# Patient Record
Sex: FEMALE | Race: WHITE | URBAN - METROPOLITAN AREA
[De-identification: names, ages, dates, MRNs, and addresses within clinical notes are randomized per-mention and may not be internally consistent; named-entity substitution may affect disease eponyms.]

---

## 2021-01-26 ENCOUNTER — VIRTUAL VISIT (OUTPATIENT)
Dept: INTERNAL MEDICINE CLINIC | Age: 26
End: 2021-01-26
Payer: COMMERCIAL

## 2021-01-26 DIAGNOSIS — L70.0 ACNE VULGARIS: ICD-10-CM

## 2021-01-26 DIAGNOSIS — Z00.00 ROUTINE GENERAL MEDICAL EXAMINATION AT A HEALTH CARE FACILITY: ICD-10-CM

## 2021-01-26 DIAGNOSIS — R09.89 GLOBUS PHARYNGEUS: ICD-10-CM

## 2021-01-26 DIAGNOSIS — Z97.5 IUD (INTRAUTERINE DEVICE) IN PLACE: Primary | ICD-10-CM

## 2021-01-26 DIAGNOSIS — G44.029 CHRONIC CLUSTER HEADACHE, NOT INTRACTABLE: ICD-10-CM

## 2021-01-26 PROBLEM — R09.A2 GLOBUS PHARYNGEUS: Status: ACTIVE | Noted: 2021-01-26

## 2021-01-26 PROBLEM — K80.20 ASYMPTOMATIC CHOLELITHIASIS: Status: ACTIVE | Noted: 2020-05-07

## 2021-01-26 PROCEDURE — 99204 OFFICE O/P NEW MOD 45 MIN: CPT | Performed by: NURSE PRACTITIONER

## 2021-01-26 RX ORDER — GALCANEZUMAB 120 MG/ML
120 INJECTION, SOLUTION SUBCUTANEOUS
Qty: 1 PEN | Refills: 3 | Status: SHIPPED | OUTPATIENT
Start: 2021-01-26 | End: 2021-02-10 | Stop reason: SDUPTHER

## 2021-01-26 RX ORDER — ZOLMITRIPTAN 5 MG/1
TABLET, FILM COATED ORAL
COMMUNITY
Start: 2020-07-15

## 2021-01-26 RX ORDER — SPIRONOLACTONE 100 MG/1
100 TABLET, FILM COATED ORAL DAILY
COMMUNITY

## 2021-01-26 RX ORDER — GALCANEZUMAB 120 MG/ML
INJECTION, SOLUTION SUBCUTANEOUS
COMMUNITY
End: 2021-01-26 | Stop reason: SDUPTHER

## 2021-01-26 RX ORDER — SUMATRIPTAN 100 MG/1
100 TABLET, FILM COATED ORAL
COMMUNITY
End: 2021-01-26

## 2021-01-26 ASSESSMENT — ENCOUNTER SYMPTOMS
COLOR CHANGE: 0
COUGH: 0
CONSTIPATION: 0
SINUS PAIN: 0
ABDOMINAL PAIN: 0
DIARRHEA: 0
WHEEZING: 0
SINUS PRESSURE: 0
BACK PAIN: 0
SHORTNESS OF BREATH: 0

## 2021-01-26 ASSESSMENT — PATIENT HEALTH QUESTIONNAIRE - PHQ9
SUM OF ALL RESPONSES TO PHQ QUESTIONS 1-9: 0
2. FEELING DOWN, DEPRESSED OR HOPELESS: 0
1. LITTLE INTEREST OR PLEASURE IN DOING THINGS: 0
SUM OF ALL RESPONSES TO PHQ QUESTIONS 1-9: 0
SUM OF ALL RESPONSES TO PHQ9 QUESTIONS 1 & 2: 0

## 2021-01-26 NOTE — ASSESSMENT & PLAN NOTE
? sinus drainage vs GERD symptoms  Trial Flonase daily  If no improvement trial Nexium  Call for worsening symptoms

## 2021-01-26 NOTE — PROGRESS NOTES
Laura Bravo (:  1995) is a 32 y.o. female,New patient, here for evaluation of the following chief complaint(s): Establish Care      ASSESSMENT/PLAN:  1. IUD (intrauterine device) in place  -     [address], [place], Gynecology, Markos Prado  2. Routine general medical examination at a health care facility  Assessment & Plan:  Discussed vaccines   Encouraged healthy diet and exercise   We will schedule full physical  3. Acne vulgaris  Assessment & Plan:  Stable, controlled  Continue Aldactone  4. Chronic cluster headache, not intractable  Assessment & Plan:  Stable, controlled  Continue Emgality  Zomig for abortive medication  5. Globus pharyngeus  Assessment & Plan:  ? sinus drainage vs GERD symptoms  Trial Flonase daily  If no improvement trial Nexium  Call for worsening symptoms       No follow-ups on file. SUBJECTIVE/OBJECTI  HPI   And is here to establish care. Recently moved to [place] from [facility]. She is generally healthy. PMH includes migraines for which she is taking Emgality and Zomig. Reports migraine every other month or so. Prior to starting Emgality she was getting frequent migraines. Has tried and failed amitriptyline,  and Topiramate as well as NSAIDs, Tylenol, Excedrin for abortive medications. She has been on Emgality for about a year and is doing very well. She recently started taking Aldactone 100 mg for acne. Tolerating well. Has noticed improvement in her symptoms. She does have an IUD in place that will need to be removed and replaced soon. She will need to establish with GYN. Today she complains of chronic phlegm in her throat and constantly having to clear her throat. This has been an ongoing issue for some time. Denies throat pain or dyspepsia. No difficulty swallowing. She does have some sinus congestion and postnasal drip. She takes antihistamines in the spring and fall. Not taking them currently.     Past Medical History: Diagnosis Date    Gastritis      Past Surgical History:   Procedure Laterality Date    SPINAL FUSION  2005     Family History   Problem Relation Age of Onset    Diabetes Maternal Aunt     Cancer Maternal Grandfather         leukemia     Current Outpatient Medications   Medication Sig Dispense Refill    spironolactone (ALDACTONE) 100 MG tablet Take 100 mg by mouth daily      levonorgestrel (MIRENA, 52 MG,) IUD 52 mg 1 each by Intrauterine route once      ZOLMitriptan (ZOMIG) 5 MG tablet Take 1 tab at onset of HA then may repeat 1 time 1 tab in 2 hours if HA persists MAX 2 tabs a day      Galcanezumab-gnlm (EMGALITY) 120 MG/ML SOAJ Inject 120 mg into the skin every 30 days Inject into the skin 1 pen 3     No current facility-administered medications for this visit. Review of Systems   Constitutional: Negative for chills, fatigue and fever. HENT: Negative for congestion, sinus pressure and sinus pain. Respiratory: Negative for cough, shortness of breath and wheezing. Cardiovascular: Negative for chest pain and palpitations. Gastrointestinal: Negative for abdominal pain, constipation and diarrhea. Musculoskeletal: Negative for arthralgias, back pain and myalgias. Skin: Negative for color change, pallor and rash. Neurological: Negative for dizziness, syncope, weakness, light-headedness and headaches. Psychiatric/Behavioral: Negative for behavioral problems, confusion and sleep disturbance. The patient is not nervous/anxious. No flowsheet data found. Physical Exam  Constitutional:       Appearance: Normal appearance. HENT:      Head: Normocephalic and atraumatic. Mouth/Throat:      Mouth: Mucous membranes are moist.   Eyes:      Extraocular Movements: Extraocular movements intact. Conjunctiva/sclera: Conjunctivae normal.   Neck:      Musculoskeletal: Normal range of motion.    Pulmonary:      Effort: Pulmonary effort is normal.   Skin: Coloration: Skin is not jaundiced or pale. Neurological:      General: No focal deficit present. Mental Status: She is alert and oriented to person, place, and time. Psychiatric:         Mood and Affect: Mood normal.         Behavior: Behavior normal.         Thought Content: Thought content normal.            On this date 01/26/21 I have spent 45 minutes reviewing previous notes, test results and face to face (virtual) with the patient discussing the diagnosis and importance of compliance with the treatment plan as well as documenting on the day of the visit. Kelly Urias is a 32 y.o. female being evaluated by a Virtual Visit (video visit) encounter to address concerns as mentioned above. A caregiver was present when appropriate. Due to this being a TeleHealth encounter (During [address] emergency), evaluation of the following organ systems was limited: Vitals/Constitutional/EENT/Resp/CV/GI//MS/Neuro/Skin/Heme-Lymph-Imm. Pursuant to the emergency declaration under the [address] [place] [address] authority and the [employer] Act, this Virtual Visit was conducted with patient's (and/or legal guardian's) consent, to reduce the patient's risk of exposure to COVID-19 and provide necessary medical care. The patient (and/or legal guardian) has also been advised to contact this office for worsening conditions or problems, and seek emergency medical treatment and/or call 911 if deemed necessary. Patient identification was verified at the start of the visit: Yes    Services were provided through a video synchronous discussion virtually to substitute for in-person clinic visit. Patient was located at home and provider was located in office or at home. An electronic signature was used to authenticate this note.     --Donnamaria Memos, APRN - CNP

## 2021-02-01 ENCOUNTER — TELEPHONE (OUTPATIENT)
Dept: INTERNAL MEDICINE CLINIC | Age: 26
End: 2021-02-01

## 2021-02-01 RX ORDER — FLUTICASONE PROPIONATE 50 MCG
1 SPRAY, SUSPENSION (ML) NASAL DAILY
Qty: 2 BOTTLE | Refills: 1 | Status: SHIPPED | OUTPATIENT
Start: 2021-02-01

## 2021-02-01 NOTE — TELEPHONE ENCOUNTER
----- Message from Jose Walters sent at 2/1/2021 12:36 PM EST -----  Subject: Message to Provider    QUESTIONS  Information for Provider? Pt called in due to a medication that was   supposed to be called in for her. It was nasal inhalant   pt is not sure on the name. She would like a call back to confirm. ---------------------------------------------------------------------------  --------------  Laure ELIAS  What is the best way for the office to contact you? OK to leave message on   voicemail  Preferred Call Back Phone Number? [phone]  ---------------------------------------------------------------------------  --------------  SCRIPT ANSWERS  Relationship to Patient?  Self

## 2021-02-10 ENCOUNTER — TELEPHONE (OUTPATIENT)
Dept: INTERNAL MEDICINE CLINIC | Age: 26
End: 2021-02-10

## 2021-02-10 RX ORDER — GALCANEZUMAB 120 MG/ML
120 INJECTION, SOLUTION SUBCUTANEOUS
Qty: 1 PEN | Refills: 3 | Status: SHIPPED | OUTPATIENT
Start: 2021-02-10 | End: 2022-02-15

## 2021-02-10 NOTE — TELEPHONE ENCOUNTER
Patient requesting a medication refill.   Medication Galcanezumab-gnlm (EMGALITY)  Dosage 120 MG/ML SOAJ   Frequency Inject 120 mg into the skin every 30 days Inject into the skin  Last filled on 1/26/21  [id number] [address] [phone] - F [phone]

## 2021-02-24 ENCOUNTER — TELEPHONE (OUTPATIENT)
Dept: INTERNAL MEDICINE CLINIC | Age: 26
End: 2021-02-24

## 2021-02-25 PROBLEM — Z00.00 ROUTINE GENERAL MEDICAL EXAMINATION AT A HEALTH CARE FACILITY: Status: RESOLVED | Noted: 2021-01-26 | Resolved: 2021-02-25

## 2021-04-06 ENCOUNTER — IMMUNIZATION (OUTPATIENT)
Dept: FAMILY MEDICINE CLINIC | Age: 26
End: 2021-04-06
Payer: COMMERCIAL

## 2021-04-06 PROCEDURE — 91300 COVID-19, PFIZER VACCINE 30MCG/0.3ML DOSE: CPT | Performed by: FAMILY MEDICINE

## 2021-04-06 PROCEDURE — 0001A COVID-19, PFIZER VACCINE 30MCG/0.3ML DOSE: CPT | Performed by: FAMILY MEDICINE

## 2021-04-27 ENCOUNTER — IMMUNIZATION (OUTPATIENT)
Dept: FAMILY MEDICINE CLINIC | Age: 26
End: 2021-04-27
Payer: COMMERCIAL

## 2021-04-27 PROCEDURE — 0002A COVID-19, PFIZER VACCINE 30MCG/0.3ML DOSE: CPT | Performed by: FAMILY MEDICINE

## 2021-04-27 PROCEDURE — 91300 COVID-19, PFIZER VACCINE 30MCG/0.3ML DOSE: CPT | Performed by: FAMILY MEDICINE

## 2021-09-29 ENCOUNTER — OFFICE VISIT (OUTPATIENT)
Dept: INTERNAL MEDICINE CLINIC | Age: 26
End: 2021-09-29
Payer: COMMERCIAL

## 2021-09-29 VITALS
OXYGEN SATURATION: 98 % | BODY MASS INDEX: 21.5 KG/M2 | HEART RATE: 88 BPM | SYSTOLIC BLOOD PRESSURE: 114 MMHG | WEIGHT: 137 LBS | DIASTOLIC BLOOD PRESSURE: 58 MMHG | HEIGHT: 67 IN

## 2021-09-29 DIAGNOSIS — M25.551 BILATERAL HIP PAIN: ICD-10-CM

## 2021-09-29 DIAGNOSIS — G43.109 MIGRAINE WITH AURA AND WITHOUT STATUS MIGRAINOSUS, NOT INTRACTABLE: ICD-10-CM

## 2021-09-29 DIAGNOSIS — Z00.00 ROUTINE GENERAL MEDICAL EXAMINATION AT A HEALTH CARE FACILITY: Primary | ICD-10-CM

## 2021-09-29 DIAGNOSIS — M25.552 BILATERAL HIP PAIN: ICD-10-CM

## 2021-09-29 DIAGNOSIS — E55.9 VITAMIN D DEFICIENCY: ICD-10-CM

## 2021-09-29 DIAGNOSIS — R53.82 CHRONIC FATIGUE: ICD-10-CM

## 2021-09-29 PROCEDURE — 90674 CCIIV4 VAC NO PRSV 0.5 ML IM: CPT | Performed by: NURSE PRACTITIONER

## 2021-09-29 PROCEDURE — 90471 IMMUNIZATION ADMIN: CPT | Performed by: NURSE PRACTITIONER

## 2021-09-29 PROCEDURE — 99395 PREV VISIT EST AGE 18-39: CPT | Performed by: NURSE PRACTITIONER

## 2021-09-29 RX ORDER — ZOLMITRIPTAN 5 MG/1
5 TABLET, FILM COATED ORAL PRN
COMMUNITY

## 2021-09-29 RX ORDER — GALCANEZUMAB 120 MG/ML
INJECTION, SOLUTION SUBCUTANEOUS
COMMUNITY
End: 2022-10-11 | Stop reason: ALTCHOICE

## 2021-09-29 ASSESSMENT — ENCOUNTER SYMPTOMS
DIARRHEA: 0
ABDOMINAL PAIN: 0
SINUS PRESSURE: 0
COUGH: 0
SHORTNESS OF BREATH: 0
SINUS PAIN: 0
WHEEZING: 0
COLOR CHANGE: 0
CONSTIPATION: 0
BACK PAIN: 1

## 2021-09-29 ASSESSMENT — PATIENT HEALTH QUESTIONNAIRE - PHQ9
DEPRESSION UNABLE TO ASSESS: FUNCTIONAL CAPACITY MOTIVATION LIMITS ACCURACY
2. FEELING DOWN, DEPRESSED OR HOPELESS: 0
SUM OF ALL RESPONSES TO PHQ9 QUESTIONS 1 & 2: 0
SUM OF ALL RESPONSES TO PHQ QUESTIONS 1-9: 0
1. LITTLE INTEREST OR PLEASURE IN DOING THINGS: 0
SUM OF ALL RESPONSES TO PHQ QUESTIONS 1-9: 0
SUM OF ALL RESPONSES TO PHQ QUESTIONS 1-9: 0

## 2021-09-29 NOTE — ASSESSMENT & PLAN NOTE
?  Low back etiology  Full range of motion  Provided with home stretches and exercises  Encouraged frequent movement  Encouraged good posture while working  Advised sit to stand desk or exercise ball chair

## 2021-09-29 NOTE — ASSESSMENT & PLAN NOTE
Stable, not controlled   Not tolerating zolmitriptan  We will start Nurtec as needed  Continue Emgality for prophylaxis

## 2021-09-29 NOTE — PROGRESS NOTES
Subjective:      Patient ID: Cruzito Harp is a 32 y.o. y.o. female. HPI    Cruzito Harp is here for a physical.    She is doing well overall. Following with GYN for women's health. She is taking her Emgality for migraine prophylaxis. This is working well overall. She continues to get 3 or 4 migraines per month. She takes zolmitriptan for this but she does experience significant side effects including fatigue and nausea. She does not like to take this unless her migraine is severe. Most the time she just suffers through it. Was previously taking sumatriptan but did not tolerate this. She also reports bilateral hip pain over the last 3 months. She does report a more sedentary lifestyle while working from home. Tries to avoid sitting in 1 position for a long time. Pain is described as dull and achy and on bilateral lateral hip. Does occasionally have low back pain as well. Pain does not radiate. No weakness or bowel or bladder incontinence. Chief Complaint   Patient presents with    Annual Exam       Vitals:    09/29/21 1142   BP: (!) 114/58   Pulse: 88   SpO2: 98%       Wt Readings from Last 3 Encounters:   09/29/21 137 lb (62.1 kg)       No past medical history on file. No past surgical history on file. No family history on file.     Social History     Tobacco Use    Smoking status: Never Smoker    Smokeless tobacco: Never Used   Substance Use Topics    Alcohol use: Not on file    Drug use: Not on file       Immunization History   Administered Date(s) Administered    Influenza, MDCK Quadv, IM, PF (Flucelvax 2 yrs and older) 09/29/2021       Health Maintenance   Topic Date Due    Hepatitis C screen  Never done    Varicella vaccine (1 of 2 - 2-dose childhood series) Never done    HPV vaccine (1 - 2-dose series) Never done    COVID-19 Vaccine (1) Never done    HIV screen  Never done    DTaP/Tdap/Td vaccine (1 - Tdap) Never done    Pap smear  Never done    Flu vaccine Completed    Hepatitis A vaccine  Aged Out    Hepatitis B vaccine  Aged Out    Hib vaccine  Aged Out    Meningococcal (ACWY) vaccine  Aged Out    Pneumococcal 0-64 years Vaccine  Aged Out       Discussed over the counter medication with patient. Israel Ruff received counseling on the following healthy behaviors: nutrition and exercise    Patient given educational materials on Nutrition and Exercise    Discussed use, benefit, and side effects of prescribed medications. Barriers to medication compliance addressed. Allpatient questions answered. Pt voiced understanding. Medications reviewed and patient understands. Questions answered    Review of Systems   Constitutional: Negative for chills, fatigue and fever. HENT: Negative for congestion, sinus pressure and sinus pain. Respiratory: Negative for cough, shortness of breath and wheezing. Cardiovascular: Negative for chest pain and palpitations. Gastrointestinal: Negative for abdominal pain, constipation and diarrhea. Musculoskeletal: Positive for arthralgias (bilateral hip pain) and back pain. Negative for myalgias. Skin: Negative for color change, pallor and rash. Neurological: Negative for dizziness, syncope, weakness, light-headedness and headaches. Psychiatric/Behavioral: Negative for behavioral problems, confusion and sleep disturbance. The patient is not nervous/anxious. Objective:   Physical Exam  Constitutional:       Appearance: She is well-developed. HENT:      Head: Normocephalic and atraumatic. Right Ear: Tympanic membrane normal.      Left Ear: Tympanic membrane normal.      Mouth/Throat:      Mouth: Mucous membranes are moist.      Pharynx: Oropharynx is clear. Eyes:      Conjunctiva/sclera: Conjunctivae normal.      Pupils: Pupils are equal, round, and reactive to light. Neck:      Thyroid: No thyromegaly. Vascular: No JVD. Cardiovascular:      Rate and Rhythm: Normal rate and regular rhythm. Heart sounds: Normal heart sounds. Pulmonary:      Effort: Pulmonary effort is normal. No respiratory distress. Breath sounds: Normal breath sounds. No wheezing. Abdominal:      General: Bowel sounds are normal. There is no distension. Palpations: Abdomen is soft. Tenderness: There is no abdominal tenderness. Musculoskeletal:         General: No deformity. Normal range of motion. Cervical back: Normal range of motion and neck supple. Skin:     General: Skin is warm and dry. Capillary Refill: Capillary refill takes less than 2 seconds. Neurological:      Mental Status: She is alert and oriented to person, place, and time. Psychiatric:         Behavior: Behavior normal.         Assessment:      See ProblemList assessment and plan       Plan:      Migraine with aura and without status migrainosus, not intractable  Stable, not controlled   Not tolerating zolmitriptan  We will start Nurtec as needed  Continue Emgality for prophylaxis    Routine general medical examination at a health care facility  Discussed vaccines   Encouraged healthy diet and exercise  Check labs  Flu vaccine given today  Following with GYN for Pap smears    Bilateral hip pain  ? Low back etiology  Full range of motion  Provided with home stretches and exercises  Encouraged frequent movement  Encouraged good posture while working  Advised sit to stand desk or exercise ball chair         Patient engaged in shared decision making. Information given to evaluate options of treatment, understand what is needed and discuss importance of following plan.

## 2021-09-29 NOTE — ASSESSMENT & PLAN NOTE
Discussed vaccines   Encouraged healthy diet and exercise  Check labs  Flu vaccine given today  Following with GYN for Pap smears

## 2021-09-29 NOTE — PATIENT INSTRUCTIONS
more stretch, put your other leg flat on the floor while pulling your knee to your chest.  Curl-ups    1. Lie on the floor on your back with your knees bent at a 90-degree angle. Your feet should be flat on the floor, about 12 inches from your buttocks. 2. Cross your arms over your chest. If this bothers your neck, try putting your hands behind your neck (not your head), with your elbows spread apart. 3. Slowly tighten your belly muscles and raise your shoulder blades off the floor. 4. Keep your head in line with your body, and do not press your chin to your chest.  5. Hold this position for 1 or 2 seconds, then slowly lower yourself back down to the floor. 6. Repeat 8 to 12 times. Pelvic tilt exercise    1. Lie on your back with your knees bent. 2. \"Brace\" your stomach. This means to tighten your muscles by pulling in and imagining your belly button moving toward your spine. You should feel like your back is pressing to the floor and your hips and pelvis are rocking back. 3. Hold for about 6 seconds while you breathe smoothly. 4. Repeat 8 to 12 times. Heel dig bridging    1. Lie on your back with both knees bent and your ankles bent so that only your heels are digging into the floor. Your knees should be bent about 90 degrees. 2. Then push your heels into the floor, squeeze your buttocks, and lift your hips off the floor until your shoulders, hips, and knees are all in a straight line. 3. Hold for about 6 seconds as you continue to breathe normally, and then slowly lower your hips back down to the floor and rest for up to 10 seconds. 4. Do 8 to 12 repetitions. Hamstring stretch in doorway    1. Lie on your back in a doorway, with one leg through the open door. 2. Slide your leg up the wall to straighten your knee. You should feel a gentle stretch down the back of your leg. 3. Hold the stretch for at least 15 to 30 seconds. Do not arch your back, point your toes, or bend either knee.  Keep one heel touching the floor and the other heel touching the wall. 4. Repeat with your other leg. 5. Do 2 to 4 times for each leg. Hip flexor stretch    1. Kneel on the floor with one knee bent and one leg behind you. Place your forward knee over your foot. Keep your other knee touching the floor. 2. Slowly push your hips forward until you feel a stretch in the upper thigh of your rear leg. 3. Hold the stretch for at least 15 to 30 seconds. Repeat with your other leg. 4. Do 2 to 4 times on each side. Wall sit    1. Stand with your back 10 to 12 inches away from a wall. 2. Lean into the wall until your back is flat against it. 3. Slowly slide down until your knees are slightly bent, pressing your lower back into the wall. 4. Hold for about 6 seconds, then slide back up the wall. 5. Repeat 8 to 12 times. Follow-up care is a key part of your treatment and safety. Be sure to make and go to all appointments, and call your doctor if you are having problems. It's also a good idea to know your test results and keep a list of the medicines you take. Where can you learn more? Go to https://[facility].Anvato. org and sign in to your Neuravi account. Enter P320 in the [facility] box to learn more about \"Low Back Pain: Exercises. \"     If you do not have an account, please click on the \"Sign Up Now\" link. Current as of: July 1, 2021               Content Version: 13.0  © 2006-2021 Healthwise, Incorporated. Care instructions adapted under license by [facility] ([facility]). If you have questions about a medical condition or this instruction, always ask your healthcare professional. [place] [address] any warranty or liability for your use of this information. Patient Education        Hip Bursitis: Exercises  Introduction  Here are some examples of exercises for you to try. The exercises may be suggested for a condition or for rehabilitation. Start each exercise slowly.  Ease off the exercises if you start to have pain. You will be told when to start these exercises and which ones will work best for you. How to do the exercises  Hip rotator stretch    1. Lie on your back with both knees bent and your feet flat on the floor. 2. Put the ankle of your affected leg on your opposite thigh near your knee. 3. Use your hand to gently push your knee away from your body until you feel a gentle stretch around your hip. 4. Hold the stretch for 15 to 30 seconds. 5. Repeat 2 to 4 times. 6. Repeat steps 1 through 5, but this time use your hand to gently pull your knee toward your opposite shoulder. Iliotibial band stretch    1. Lean sideways against a wall. If you are not steady on your feet, hold on to a chair or counter. 2. Stand on the leg with the affected hip, with that leg close to the wall. Then cross your other leg in front of it. 3. Let your affected hip drop out to the side of your body and against wall. Then lean away from your affected hip until you feel a stretch. 4. Hold the stretch for 15 to 30 seconds. 5. Repeat 2 to 4 times. Straight-leg raises to the outside    1. Lie on your side, with your affected hip on top. 2. Tighten the front thigh muscles of your top leg to keep your knee straight. 3. Keep your hip and your leg straight in line with the rest of your body, and keep your knee pointing forward. Do not drop your hip back. 4. Lift your top leg straight up toward the ceiling, about 12 inches off the floor. Hold for about 6 seconds, then slowly lower your leg. 5. Repeat 8 to 12 times. Clamshell    1. Lie on your side, with your affected hip on top and your head propped on a pillow. Keep your feet and knees together and your knees bent. 2. Raise your top knee, but keep your feet together. Do not let your hips roll back. Your legs should open up like a clamshell. 3. Hold for 6 seconds. 4. Slowly lower your knee back down. Rest for 10 seconds.   5. Repeat 8 to 12 times.  Follow-up care is a key part of your treatment and safety. Be sure to make and go to all appointments, and call your doctor if you are having problems. It's also a good idea to know your test results and keep a list of the medicines you take. Where can you learn more? Go to https://chpepiceweb.Websupport. org and sign in to your Simpli.fi account. Enter C661 in the mobiliThink box to learn more about \"Hip Bursitis: Exercises. \"     If you do not have an account, please click on the \"Sign Up Now\" link. Current as of: July 1, 2021               Content Version: 13.0  © 2006-2021 Healthwise, Incorporated. Care instructions adapted under license by [facility] [address] If you have questions about a medical condition or this instruction, always ask your healthcare professional. [address] any warranty or liability for your use of this information.

## 2021-10-19 ENCOUNTER — TELEPHONE (OUTPATIENT)
Dept: INTERNAL MEDICINE CLINIC | Age: 26
End: 2021-10-19

## 2021-10-19 NOTE — TELEPHONE ENCOUNTER
Pt asking if you received her fax yesterday  It was a form to be filled out with her lab information  She would like it sent back to [email or web address]

## 2021-10-29 PROBLEM — Z00.00 ROUTINE GENERAL MEDICAL EXAMINATION AT A HEALTH CARE FACILITY: Status: RESOLVED | Noted: 2021-09-29 | Resolved: 2021-10-29

## 2022-01-19 ENCOUNTER — TELEPHONE (OUTPATIENT)
Dept: INTERNAL MEDICINE CLINIC | Age: 27
End: 2022-01-19

## 2022-01-19 NOTE — TELEPHONE ENCOUNTER
Staci from [employer] asking if the pt's as the pt's [facility] PA was submitted, approved or denied  If Staci doesn't answer leave the pt's name, , and status of the PA

## 2022-01-28 ENCOUNTER — OFFICE VISIT (OUTPATIENT)
Dept: INTERNAL MEDICINE CLINIC | Age: 27
End: 2022-01-28
Payer: COMMERCIAL

## 2022-01-28 VITALS
OXYGEN SATURATION: 99 % | DIASTOLIC BLOOD PRESSURE: 62 MMHG | SYSTOLIC BLOOD PRESSURE: 102 MMHG | HEIGHT: 67 IN | WEIGHT: 137 LBS | BODY MASS INDEX: 21.5 KG/M2 | TEMPERATURE: 97.1 F | HEART RATE: 85 BPM

## 2022-01-28 DIAGNOSIS — G43.709 CHRONIC MIGRAINE W/O AURA W/O STATUS MIGRAINOSUS, NOT INTRACTABLE: Primary | ICD-10-CM

## 2022-01-28 DIAGNOSIS — G43.109 MIGRAINE WITH AURA AND WITHOUT STATUS MIGRAINOSUS, NOT INTRACTABLE: ICD-10-CM

## 2022-01-28 PROCEDURE — 99214 OFFICE O/P EST MOD 30 MIN: CPT | Performed by: NURSE PRACTITIONER

## 2022-01-28 SDOH — ECONOMIC STABILITY: FOOD INSECURITY: WITHIN THE PAST 12 MONTHS, THE FOOD YOU BOUGHT JUST DIDN'T LAST AND YOU DIDN'T HAVE MONEY TO GET MORE.: NEVER TRUE

## 2022-01-28 SDOH — ECONOMIC STABILITY: FOOD INSECURITY: WITHIN THE PAST 12 MONTHS, YOU WORRIED THAT YOUR FOOD WOULD RUN OUT BEFORE YOU GOT MONEY TO BUY MORE.: NEVER TRUE

## 2022-01-28 ASSESSMENT — SOCIAL DETERMINANTS OF HEALTH (SDOH): HOW HARD IS IT FOR YOU TO PAY FOR THE VERY BASICS LIKE FOOD, HOUSING, MEDICAL CARE, AND HEATING?: NOT HARD AT ALL

## 2022-01-28 ASSESSMENT — ENCOUNTER SYMPTOMS
CONSTIPATION: 0
SINUS PRESSURE: 0
WHEEZING: 0
COUGH: 0
BACK PAIN: 0
COLOR CHANGE: 0
ABDOMINAL PAIN: 0
SINUS PAIN: 0
DIARRHEA: 0
SHORTNESS OF BREATH: 0

## 2022-01-28 NOTE — ASSESSMENT & PLAN NOTE
Not controlled  Doing well on Nurtec for abortive therapy  Emgality is not working well  We will start Nurtec for abortive therapy and prevention

## 2022-01-28 NOTE — PROGRESS NOTES
Dwayne Grande (:  1995) is a 32 y.o. female,Established patient, here for evaluation of the following chief complaint(s):  Migraine and Rash (has bumps on fingers that \"hurt so bad\")      ASSESSMENT/PLAN:  1. Chronic migraine w/o aura w/o status migrainosus, not intractable  -     Rimegepant Sulfate 75 MG TBDP; Take 75 mg by mouth every other day, Disp-16 tablet, R-5Normal  2. Migraine with aura and without status migrainosus, not intractable  Assessment & Plan:  Not controlled  Doing well on Nurtec for abortive therapy  Emgality is not working well  We will start Nurtec for abortive therapy and prevention      No follow-ups on file. SUBJECTIVE/OBJECTIVE:  HPI  Patient is here for follow-up of her chronic migraines. She has been on Emgality for some time. Unfortunately this has not been as effective as it has been in the past.  She is having issues getting it covered with insurance. She reports at least 2 migraines per month with predromal and postdromal phases lasting 2 to 3 days prior and following. She is taking Nurtec for abortive therapy which has been working well. She has previously tried zolmitriptan and sumatriptan and which she did not tolerate due to side effects. Current Outpatient Medications   Medication Sig Dispense Refill    Rimegepant Sulfate 75 MG TBDP Take 75 mg by mouth every other day 16 tablet 5    Rimegepant Sulfate 75 MG TBDP Take 75 mg by mouth daily as needed (migraine) 6 tablet 0    levonorgestrel (MIRENA, 52 MG,) IUD 52 mg 1 each by IntraUTERine route once      Rimegepant Sulfate 75 MG TBDP Take 75 mg by mouth daily as needed      Galcanezumab-gnlm (EMGALITY) 120 MG/ML SOAJ Inject into the skin (Patient not taking: Reported on 2022)      ZOLMitriptan (ZOMIG) 5 MG tablet Take 5 mg by mouth as needed (Patient not taking: Reported on 2022)       No current facility-administered medications for this visit.        Review of Systems   Constitutional: Negative for chills, fatigue and fever. HENT: Negative for congestion, sinus pressure and sinus pain. Respiratory: Negative for cough, shortness of breath and wheezing. Cardiovascular: Negative for chest pain and palpitations. Gastrointestinal: Negative for abdominal pain, constipation and diarrhea. Musculoskeletal: Negative for arthralgias, back pain and myalgias. Skin: Negative for color change, pallor and rash. Neurological: Positive for headaches. Negative for dizziness, syncope, weakness and light-headedness. Psychiatric/Behavioral: Negative for behavioral problems, confusion and sleep disturbance. The patient is not nervous/anxious. Vitals:    01/28/22 1156   BP: 102/62   Site: Left Upper Arm   Position: Sitting   Cuff Size: Medium Adult   Pulse: 85   Temp: 97.1 °F (36.2 °C)   SpO2: 99%   Weight: 137 lb (62.1 kg)   Height: 5' 7\" (1.702 m)       Physical Exam  Constitutional:       Appearance: Normal appearance. HENT:      Head: Normocephalic and atraumatic. Mouth/Throat:      Mouth: Mucous membranes are moist.   Eyes:      Extraocular Movements: Extraocular movements intact. Conjunctiva/sclera: Conjunctivae normal.   Pulmonary:      Effort: Pulmonary effort is normal.   Musculoskeletal:      Cervical back: Normal range of motion. Skin:     Coloration: Skin is not jaundiced or pale. Neurological:      General: No focal deficit present. Mental Status: She is alert and oriented to person, place, and time. Psychiatric:         Mood and Affect: Mood normal.         Behavior: Behavior normal.         Thought Content: Thought content normal.         An electronic signature was used to authenticate this note.     --Everardo Mena, PAOLA - CNP

## 2022-02-01 ENCOUNTER — TELEPHONE (OUTPATIENT)
Dept: ADMINISTRATIVE | Age: 27
End: 2022-02-01

## 2022-02-03 ENCOUNTER — TELEPHONE (OUTPATIENT)
Dept: ORTHOPEDIC SURGERY | Age: 27
End: 2022-02-03

## 2022-02-15 RX ORDER — GALCANEZUMAB 120 MG/ML
120 INJECTION, SOLUTION SUBCUTANEOUS
Qty: 2 PEN | Refills: 3 | Status: SHIPPED | OUTPATIENT
Start: 2022-02-15

## 2022-02-22 ENCOUNTER — TELEPHONE (OUTPATIENT)
Dept: FAMILY MEDICINE CLINIC | Age: 27
End: 2022-02-22

## 2022-02-22 NOTE — TELEPHONE ENCOUNTER
Staci from the [facility] is calling to check the status of the pt's [facility]  If she doesn't answer leave pt's name, , and status of the PA

## 2022-05-03 ENCOUNTER — TELEPHONE (OUTPATIENT)
Dept: FAMILY MEDICINE CLINIC | Age: 27
End: 2022-05-03

## 2022-05-03 NOTE — TELEPHONE ENCOUNTER
----- Message from Jade Morris sent at 5/3/2022  8:01 AM EDT -----  Subject: Appointment Request    Reason for Call: Urgent Cough Cold    QUESTIONS  Type of Appointment? Established Patient  Reason for appointment request? No appointments available during search  Additional Information for Provider? Pt has a cough x4days and pls call pt     ---------------------------------------------------------------------------  --------------  CALL BACK INFO  What is the best way for the office to contact you? OK to leave message on   voicemail  Preferred Call Back Phone Number? [phone]  ---------------------------------------------------------------------------  --------------  SCRIPT ANSWERS  Relationship to Patient? Self  Are you currently unable to finish sentences due to any difficulty   breathing? No  Are you unable to swallow liquids? No  Are you having fevers (100.4 or greater), chills, or sweats? Yes   Have you been diagnosed with, awaiting test results for, or told that you   are suspected of having COVID-19 (Coronavirus)? (If patient has tested   negative or was tested as a requirement for work, school, or travel and   not based on symptoms, answer no)? No  Within the past 10 days have you developed any of the following symptoms   (answer no if symptoms have been present longer than 10 days or began   more than 10 days ago)? Fever or Chills, Cough, Shortness of breath or   difficulty breathing, Loss of taste or smell, Sore throat, Nasal   congestion, Sneezing or runny nose, Fatigue or generalized body aches   (answer no if pain is specific to a body part e.g. back pain), Diarrhea,   Headache?  Yes

## 2022-10-11 ENCOUNTER — OFFICE VISIT (OUTPATIENT)
Dept: FAMILY MEDICINE CLINIC | Age: 27
End: 2022-10-11
Payer: COMMERCIAL

## 2022-10-11 VITALS
HEART RATE: 82 BPM | HEIGHT: 67 IN | SYSTOLIC BLOOD PRESSURE: 120 MMHG | DIASTOLIC BLOOD PRESSURE: 82 MMHG | WEIGHT: 137 LBS | BODY MASS INDEX: 21.5 KG/M2 | TEMPERATURE: 97.3 F | OXYGEN SATURATION: 99 %

## 2022-10-11 DIAGNOSIS — G43.109 MIGRAINE WITH AURA AND WITHOUT STATUS MIGRAINOSUS, NOT INTRACTABLE: Primary | ICD-10-CM

## 2022-10-11 PROCEDURE — 99214 OFFICE O/P EST MOD 30 MIN: CPT | Performed by: NURSE PRACTITIONER

## 2022-10-11 ASSESSMENT — ENCOUNTER SYMPTOMS
EYE REDNESS: 0
BACK PAIN: 0
DIARRHEA: 0
WHEEZING: 0
RHINORRHEA: 0
ABDOMINAL PAIN: 0
COUGH: 0
EYE ITCHING: 0
CONSTIPATION: 0
COLOR CHANGE: 0
VOMITING: 0
SINUS PRESSURE: 0
SHORTNESS OF BREATH: 0
CHEST TIGHTNESS: 0
NAUSEA: 0
BLOOD IN STOOL: 0
SORE THROAT: 0

## 2022-10-11 ASSESSMENT — PATIENT HEALTH QUESTIONNAIRE - PHQ9
SUM OF ALL RESPONSES TO PHQ QUESTIONS 1-9: 0
2. FEELING DOWN, DEPRESSED OR HOPELESS: 0
1. LITTLE INTEREST OR PLEASURE IN DOING THINGS: 0
SUM OF ALL RESPONSES TO PHQ9 QUESTIONS 1 & 2: 0
SUM OF ALL RESPONSES TO PHQ QUESTIONS 1-9: 0

## 2022-10-11 NOTE — PROGRESS NOTES
Mona Vazquez (:  1995) is a 32 y.o. female,Established patient, here for evaluation of the following chief complaint(s):  Follow-up (migraine)      ASSESSMENT/PLAN:  1. Migraine with aura and without status migrainosus, not intractable  Assessment & Plan:  Patient doing well on nurtec, will refill this today. She is getting her IUD removed next week, will monitor for return of cyclic migraines. Given samples in office today     No follow-ups on file. SUBJECTIVE/OBJECTIVE:  Patient presents for follow up for migraines. She has been doing much better on nurtec when taking it every other day. She only has a migraine 1x a month now. She is taking her IUD out next week, concerned for rebound cyclic migraines, which she experienced previously without the IUD. Current Outpatient Medications   Medication Sig Dispense Refill    Rimegepant Sulfate 75 MG TBDP Take 75 mg by mouth every other day 18 tablet 3    levonorgestrel (MIRENA, 52 MG,) IUD 52 mg 1 each by IntraUTERine route once      ZOLMitriptan (ZOMIG) 5 MG tablet Take 5 mg by mouth as needed      Rimegepant Sulfate 75 MG TBDP Take 75 mg by mouth daily as needed       No current facility-administered medications for this visit. Review of Systems   Constitutional:  Negative for chills, fatigue and fever. HENT:  Negative for congestion, ear pain, postnasal drip, rhinorrhea, sinus pressure, sneezing and sore throat. Eyes:  Negative for redness and itching. Respiratory:  Negative for cough, chest tightness, shortness of breath and wheezing. Cardiovascular:  Negative for chest pain and palpitations. Gastrointestinal:  Negative for abdominal pain, blood in stool, constipation, diarrhea, nausea and vomiting. Endocrine: Negative for cold intolerance and heat intolerance. Genitourinary:  Negative for difficulty urinating, dysuria, flank pain, frequency, hematuria and urgency.    Musculoskeletal:  Negative for arthralgias, back pain, joint swelling and myalgias. Skin:  Negative for color change, pallor, rash and wound. Allergic/Immunologic: Negative for environmental allergies and food allergies. Neurological:  Positive for headaches. Negative for dizziness, seizures, syncope, weakness, light-headedness and numbness. Hematological:  Negative for adenopathy. Does not bruise/bleed easily. Psychiatric/Behavioral:  Negative for confusion, sleep disturbance and suicidal ideas. The patient is not nervous/anxious and is not hyperactive. Vitals:    10/11/22 1352   BP: 120/82   Site: Left Upper Arm   Position: Sitting   Cuff Size: Medium Adult   Pulse: 82   Temp: 97.3 °F (36.3 °C)   SpO2: 99%   Weight: 137 lb (62.1 kg)   Height: 5' 7\" (1.702 m)       Physical Exam  Vitals reviewed. Constitutional:       Appearance: Normal appearance. HENT:      Head: Normocephalic and atraumatic. Nose: Nose normal.   Eyes:      Extraocular Movements: Extraocular movements intact. Conjunctiva/sclera: Conjunctivae normal.      Pupils: Pupils are equal, round, and reactive to light. Pulmonary:      Effort: Pulmonary effort is normal.   Musculoskeletal:         General: Normal range of motion. Cervical back: Normal range of motion. Skin:     Coloration: Skin is not jaundiced or pale. Findings: No bruising, erythema, lesion or rash. Neurological:      Mental Status: She is alert and oriented to person, place, and time. Mental status is at baseline. Psychiatric:         Mood and Affect: Mood normal.         Behavior: Behavior normal.         Thought Content: Thought content normal.         Judgment: Judgment normal.         On this date 10/11/2022 I have spent 30 minutes reviewing previous notes, test results and face to face with the patient discussing the diagnosis and importance of compliance with the treatment plan as well as documenting on the day of the visit.       An electronic signature was used to authenticate this note.    --Nakul Tuttle, PAOLA - CNP

## 2022-10-12 ENCOUNTER — TELEPHONE (OUTPATIENT)
Dept: ORTHOPEDIC SURGERY | Age: 27
End: 2022-10-12

## 2022-10-14 ENCOUNTER — TELEPHONE (OUTPATIENT)
Dept: ORTHOPEDIC SURGERY | Age: 27
End: 2022-10-14

## 2022-10-14 NOTE — TELEPHONE ENCOUNTER
Submitted PA for Nurtec 75MG dispersible tablets Via [facility] (Key: [id number] STATUS: APPROVED 10/14/22 to 10/13/23; Approval letter attached. If this requires a response please respond to the pool ( P [facility] [address]). Thank you please advise patient.

## 2023-01-19 ENCOUNTER — TELEPHONE (OUTPATIENT)
Dept: ORTHOPEDIC SURGERY | Age: 28
End: 2023-01-19

## 2023-01-19 NOTE — TELEPHONE ENCOUNTER
Submitted PA for Emgality 120MG/ML auto-injectors (migraine)  Via [facility]MM Key: [id number] STATUS: APPROVED 01/20/23 to 02/19/23 for 2mL per 30 days. An additional prior authorization [id number] has been entered for [facility] with 5 tariq that is effective from 01/20/23 - 07/19/23 and is limited to 1mL per 30 days. Approval letter attached. If this requires a response please respond to the pool ( [facility] [address] [facility]). Thank you please advise patient.

## 2023-02-20 NOTE — TELEPHONE ENCOUNTER
Submitted PA for Nurtec 75MG dispersible tablets  Via [facility] Key: [id number] STATUS: PENDING Cyclosporine Pregnancy And Lactation Text: This medication is Pregnancy Category C and it isn't know if it is safe during pregnancy. This medication is excreted in breast milk.